# Patient Record
Sex: MALE | Race: WHITE | NOT HISPANIC OR LATINO | URBAN - METROPOLITAN AREA
[De-identification: names, ages, dates, MRNs, and addresses within clinical notes are randomized per-mention and may not be internally consistent; named-entity substitution may affect disease eponyms.]

---

## 2021-03-16 ENCOUNTER — OUTPATIENT (OUTPATIENT)
Dept: OUTPATIENT SERVICES | Facility: HOSPITAL | Age: 31
LOS: 1 days | Discharge: HOME | End: 2021-03-16

## 2021-03-17 DIAGNOSIS — F10.20 ALCOHOL DEPENDENCE, UNCOMPLICATED: ICD-10-CM

## 2021-03-17 DIAGNOSIS — F31.9 BIPOLAR DISORDER, UNSPECIFIED: ICD-10-CM

## 2021-07-01 ENCOUNTER — OUTPATIENT (OUTPATIENT)
Dept: OUTPATIENT SERVICES | Facility: HOSPITAL | Age: 31
LOS: 1 days | Discharge: HOME | End: 2021-07-01

## 2021-07-02 DIAGNOSIS — F12.20 CANNABIS DEPENDENCE, UNCOMPLICATED: ICD-10-CM

## 2021-07-02 DIAGNOSIS — F43.10 POST-TRAUMATIC STRESS DISORDER, UNSPECIFIED: ICD-10-CM

## 2021-07-02 DIAGNOSIS — F10.10 ALCOHOL ABUSE, UNCOMPLICATED: ICD-10-CM

## 2021-07-02 DIAGNOSIS — F17.200 NICOTINE DEPENDENCE, UNSPECIFIED, UNCOMPLICATED: ICD-10-CM

## 2021-09-01 ENCOUNTER — EMERGENCY (EMERGENCY)
Facility: HOSPITAL | Age: 31
LOS: 1 days | Discharge: HOME | End: 2021-09-01
Admitting: STUDENT IN AN ORGANIZED HEALTH CARE EDUCATION/TRAINING PROGRAM
Payer: MEDICAID

## 2021-09-01 VITALS
HEART RATE: 68 BPM | DIASTOLIC BLOOD PRESSURE: 74 MMHG | RESPIRATION RATE: 18 BRPM | SYSTOLIC BLOOD PRESSURE: 119 MMHG | OXYGEN SATURATION: 100 % | TEMPERATURE: 97 F

## 2021-09-01 PROCEDURE — 99283 EMERGENCY DEPT VISIT LOW MDM: CPT

## 2021-09-01 RX ORDER — OXYCODONE HYDROCHLORIDE 5 MG/1
1 TABLET ORAL
Qty: 8 | Refills: 0
Start: 2021-09-01 | End: 2021-09-02

## 2021-09-01 NOTE — ED PROVIDER NOTE - CLINICAL SUMMARY MEDICAL DECISION MAKING FREE TEXT BOX
Pt is a 30 y/o M no pertinent PMHx p/w dental pain x 2 months. -- tooth pain, dental caries; not concerning for associated dental abscess at this time -- pain control, dental follow up

## 2021-09-01 NOTE — ED PROVIDER NOTE - PROGRESS NOTE DETAILS
RENÉ DENG:  Pt medically stable for discharge.  Strict return precautions given.  Pt to follow up with PMD and dental clinic.

## 2021-09-01 NOTE — ED PROVIDER NOTE - OBJECTIVE STATEMENT
Pt is a 32 y/o M no pertinent PMHx p/w dental pain x 2 months.  Pt reports he has had several cavities at several teeth for past 3 years.  Over time, he has been in the process of getting root canals.  He states he has had pain at tooth #18 and 19 for past 3 years.  He states there was a plan to get root canals performed at these teeth, however, he states his insurance lapsed, and therefore was not able to get procedure done.  He notes recurrence of pain for past 2 months, improves with  mg, which he takes up to 3x/day.  He reports taking ibuprofen 2 hours ago; presently pain is moderate in intensity.  Pt denies any fevers, chills, headache, facial swelling, difficulty swallowing, or any other specific complaints.

## 2021-09-01 NOTE — ED PROVIDER NOTE - PATIENT PORTAL LINK FT
You can access the FollowMyHealth Patient Portal offered by [facility] by registering at the following website: [email or web address]. By joining [facility]’s FollowMyHealth portal, you will also be able to view your health information using other applications (apps) compatible with our system.

## 2021-09-01 NOTE — ED PROVIDER NOTE - TOOTH LOCATION
No adjacent vestibular, gingival swelling/tenderness; no overlying facial swelling; tooth nonmobile/first molar/second molar

## 2021-09-01 NOTE — ED ADULT TRIAGE NOTE - CHIEF COMPLAINT QUOTE
pt c/o dental pain x3 months, states known cracked molar and has been attempting to see dentist but insurance lapsed.

## 2021-09-01 NOTE — ED PROVIDER NOTE - NSFOLLOWUPINSTRUCTIONS_ED_ALL_ED_FT
Advance activity as tolerated.  Continue all previously prescribed medications as directed unless otherwise instructed.  Take Percocet 325/5 once every 6 hours as needed for severe pain -- causes drowsiness; DO NOT drink alcohol, drive, or operate heavy machinery with this medication.  Caution federal law prohibits the transfer of this drug to any person other  than the person for whom it was prescribed. May cause drowsiness.  Alcohol may intensify this effect.  Use care when operating dangerous machinery.  This prescription cannot be refilled. Using more of this medication than prescribed may cause serious breathing problems  Follow up with your primary care physician and dental (call [phone] to make an appointment)  in 48-72 hours- bring copies of your results.  Return to the ER for worsening or persistent symptoms, including but not limited to worsening/persistent pain, fevers, facial swelling, difficulty swallowing, and/or ANY NEW OR CONCERNING SYMPTOMS. If you have issues obtaining follow up, please call: [phone] to obtain a doctor or specialist who takes your insurance in your area.  You may call [phone] to make an appointment with the internal medicine clinic.

## 2021-09-01 NOTE — ED ADULT NURSE NOTE - OBJECTIVE STATEMENT
Received pt in intake room with Dental pain. patient stated that the left molar tooth is broken and needs to have root canal done. patient c/o severe pain and swelling to the area.

## 2021-09-23 PROBLEM — Z00.00 ENCOUNTER FOR PREVENTIVE HEALTH EXAMINATION: Status: ACTIVE | Noted: 2021-09-23

## 2021-09-24 ENCOUNTER — APPOINTMENT (OUTPATIENT)
Dept: INTERNAL MEDICINE | Facility: CLINIC | Age: 31
End: 2021-09-24
Payer: MEDICAID

## 2021-09-24 ENCOUNTER — OUTPATIENT (OUTPATIENT)
Dept: OUTPATIENT SERVICES | Facility: HOSPITAL | Age: 31
LOS: 1 days | End: 2021-09-24

## 2021-09-24 ENCOUNTER — TRANSCRIPTION ENCOUNTER (OUTPATIENT)
Age: 31
End: 2021-09-24

## 2021-09-24 VITALS
SYSTOLIC BLOOD PRESSURE: 110 MMHG | BODY MASS INDEX: 19.88 KG/M2 | OXYGEN SATURATION: 99 % | HEART RATE: 80 BPM | WEIGHT: 150 LBS | HEIGHT: 73 IN | DIASTOLIC BLOOD PRESSURE: 80 MMHG

## 2021-09-24 VITALS — TEMPERATURE: 98.7 F

## 2021-09-24 DIAGNOSIS — K04.7 PERIAPICAL ABSCESS W/OUT SINUS: ICD-10-CM

## 2021-09-24 DIAGNOSIS — H93.90 UNSPECIFIED DISORDER OF EAR, UNSPECIFIED EAR: ICD-10-CM

## 2021-09-24 PROCEDURE — 99213 OFFICE O/P EST LOW 20 MIN: CPT | Mod: GE

## 2021-09-24 RX ORDER — IBUPROFEN, ACETAMINOPHEN 125; 250 MG/1; MG/1
125-250 TABLET, FILM COATED ORAL
Refills: 0 | Status: ACTIVE | COMMUNITY

## 2021-10-01 NOTE — PHYSICAL EXAM
[Normal Sclera/Conjunctiva] : normal sclera/conjunctiva [EOMI] : extraocular movements intact [No Lymphadenopathy] : no lymphadenopathy [Normal] : normal rate, regular rhythm, normal S1 and S2 and no murmur heard [No Edema] : there was no peripheral edema [Coordination Grossly Intact] : coordination grossly intact [de-identified] : 3x4cm ear lesions L ear w/underlying fluctuance; no tenderness or drainage; R ear w/2x2 cm ear lesion w/underlying fluctuance; no tenderness or drainage; no lymphadenopathy

## 2021-10-01 NOTE — END OF VISIT
[] : Resident [FreeTextEntry3] : referred to dental clinic and Derm. Despite extensive discussion, is not open to getting COVID vaccine. Continue to address at subsequent visits.

## 2021-10-01 NOTE — REVIEW OF SYSTEMS
[Fever] : no fever [Chills] : no chills [Vision Problems] : no vision problems [Earache] : no earache [Hearing Loss] : no hearing loss [Postnasal Drip] : no postnasal drip [Chest Pain] : no chest pain [Palpitations] : no palpitations [Lower Ext Edema] : no lower extremity edema [Orthopena] : no orthopnea [Shortness Of Breath] : no shortness of breath [Wheezing] : no wheezing [Cough] : no cough [Abdominal Pain] : no abdominal pain [Nausea] : no nausea [Diarrhea] : no diarrhea [Vomiting] : no vomiting [Dysuria] : no dysuria [Back Pain] : no back pain [Itching] : no itching [Headache] : no headache

## 2021-10-01 NOTE — ASSESSMENT
[FreeTextEntry1] : #HCM\par - discussed w/pt COVID vaccination and extensively answered all questions; pt does not wish to get it at this time\par \par d/w Dr. Dallas\par \par RTC in 6 months for comprehensive visit.

## 2021-10-01 NOTE — HISTORY OF PRESENT ILLNESS
[FreeTextEntry8] : 31M w/no PMHx coming in for acute complaint of b/l ear lesions and dental pain. Pt states about 1.5 years ago, he developed small raised lesions to b/l posterior ears, which he drained with a needle w/clear liquid drainage. After this, the lesions have been progressively recurring; now pt has 2 3x4cm lesions reported as "squishy" to the L ear and 1 2x2cm lesion on the right ear. Reports occasional pruritus without associated pain. Denies any fevers, chills, sore throat, rhinorrhea, congestion. Pt was seen in the ED for his dental pain and advised to follow up with dental for root canal procedure. Denies any worsening pain or discharge from the area. Pt takes advil dual action (ibuprofen 125/tylenol 250) x6 daily for pain control.

## 2021-10-07 DIAGNOSIS — H93.90 UNSPECIFIED DISORDER OF EAR, UNSPECIFIED EAR: ICD-10-CM

## 2021-10-07 DIAGNOSIS — K04.7 PERIAPICAL ABSCESS WITHOUT SINUS: ICD-10-CM

## 2021-11-05 ENCOUNTER — APPOINTMENT (OUTPATIENT)
Dept: DERMATOLOGY | Facility: CLINIC | Age: 31
End: 2021-11-05

## 2024-08-29 ENCOUNTER — OFFICE VISIT (OUTPATIENT)
Age: 34
End: 2024-08-29
Payer: COMMERCIAL

## 2024-08-29 VITALS
RESPIRATION RATE: 18 BRPM | HEART RATE: 67 BPM | SYSTOLIC BLOOD PRESSURE: 118 MMHG | OXYGEN SATURATION: 99 % | DIASTOLIC BLOOD PRESSURE: 78 MMHG | TEMPERATURE: 96.5 F | WEIGHT: 162.6 LBS

## 2024-08-29 DIAGNOSIS — L02.416 ABSCESS OF LEFT THIGH: Primary | ICD-10-CM

## 2024-08-29 PROCEDURE — S9088 SERVICES PROVIDED IN URGENT: HCPCS | Performed by: STUDENT IN AN ORGANIZED HEALTH CARE EDUCATION/TRAINING PROGRAM

## 2024-08-29 PROCEDURE — 99213 OFFICE O/P EST LOW 20 MIN: CPT | Performed by: STUDENT IN AN ORGANIZED HEALTH CARE EDUCATION/TRAINING PROGRAM

## 2024-08-29 RX ORDER — SULFAMETHOXAZOLE/TRIMETHOPRIM 800-160 MG
1 TABLET ORAL EVERY 12 HOURS SCHEDULED
Qty: 14 TABLET | Refills: 0 | Status: SHIPPED | OUTPATIENT
Start: 2024-08-29 | End: 2024-09-05

## 2024-08-29 NOTE — PROGRESS NOTES
[facility]        NAME: Isai Thomas is a 34 y.o. male  : 1990    MRN: [id number]  DATE: 2024  TIME: 6:41 PM    Assessment and Orders   Abscess of left thigh [L02.416]  1. Abscess of left thigh  sulfamethoxazole-trimethoprim (BACTRIM DS) 800-160 mg per tablet            Plan and Discussion      Patient presenting with abscess to the left inner thigh.  Patient states that this is a recurring issue for him.  Abscess is currently draining and there is cellulitis surrounding the area.  Will treat with oral Bactrim x 7 days.  Encouraged to follow-up with his PCP for possible surgical referral.  Risks and benefits discussed. Patient understands and agrees with the plan.     PATIENT INSTRUCTIONS    If tests have been performed at [facility], our office will contact you with results if changes need to be made to the care plan discussed with you at the visit.  [facility]    Follow up with PCP.     If any of the following occur, please report to your nearest ED for evaluation or call 911.   Difficultly breathing or shortness of breath  Chest pain  Acutely worsening symptoms.         Chief Complaint     Chief Complaint   Patient presents with    Leg Pain     Claims having an abscess on proximal medial L thigh X 2 - 3 weeks         History of Present Illness       Patient is a 34-year-old male who presents with abscess to his left thigh.  Patient states that he has history of these abscesses.  He states that this abscess has been there for approximately a week and a half.  Started to drain today.  Has noticed increasing redness around the area which prompted visit.  Has never seen a general surgeon for this.  States he has overall bodyaches but denies fevers        Review of Systems   Review of Systems  As stated above     Current Medications       Current Outpatient Medications:     sulfamethoxazole-trimethoprim (BACTRIM DS) 800-160 mg per tablet, Take  1 tablet by mouth every 12 (twelve) hours for 7 days, Disp: 14 tablet, Rfl: 0    Current Allergies     Allergies as of 08/29/2024 - never reviewed   Allergen Reaction Noted    Penicillins Hives 02/18/2019            The following portions of the patient's history were reviewed and updated as appropriate: allergies, current medications, past family history, past medical history, past social history, past surgical history and problem list.     No past medical history on file.    No past surgical history on file.    No family history on file.      Medications have been verified.        Objective   /78 (BP Location: Right arm, Patient Position: Sitting, Cuff Size: Adult)   Pulse 67   Temp (!) 96.5 °F (35.8 °C) (Tympanic)   Resp 18   Wt 73.8 kg (162 lb 9.6 oz)   SpO2 99%   No LMP for male patient.       Physical Exam     Physical Exam  Constitutional:       General: He is not in acute distress.  Pulmonary:      Effort: Pulmonary effort is normal.   Musculoskeletal:         General: Swelling and tenderness present.   Skin:     Findings: Erythema and lesion present.          Neurological:      General: No focal deficit present.      Mental Status: He is alert.   Psychiatric:         Mood and Affect: Mood normal.               Marcia Jerez DO